# Patient Record
Sex: FEMALE | Race: WHITE | NOT HISPANIC OR LATINO | URBAN - NONMETROPOLITAN AREA
[De-identification: names, ages, dates, MRNs, and addresses within clinical notes are randomized per-mention and may not be internally consistent; named-entity substitution may affect disease eponyms.]

---

## 2020-01-16 ENCOUNTER — IMPORTED ENCOUNTER (OUTPATIENT)
Dept: URBAN - NONMETROPOLITAN AREA CLINIC 1 | Facility: CLINIC | Age: 50
End: 2020-01-16

## 2020-01-16 PROBLEM — H52.4: Noted: 2020-01-16

## 2020-01-16 PROBLEM — H52.13: Noted: 2020-01-16

## 2020-01-16 PROBLEM — H52.223: Noted: 2020-01-16

## 2020-01-16 PROCEDURE — 92310 CONTACT LENS FITTING OU: CPT

## 2020-01-16 PROCEDURE — 92014 COMPRE OPH EXAM EST PT 1/>: CPT

## 2020-01-16 PROCEDURE — 92015 DETERMINE REFRACTIVE STATE: CPT

## 2020-01-16 NOTE — PATIENT DISCUSSION
Myopia OU-Discussed diagnosis with patient. -Explained that people who are myopic are at a higher risk for developing RD/RT and reviewed associated S&S.-Pt to contact our office if symptoms develop. Astigmatism OU-Discussed diagnosis with patient. Presbyopia-Discussed diagnosis with patient. Updated spec Rx given. Recommend lens that will provide comfort as well as protect safety and health of eyes. CL wear-Trial of Acuvue Oasys given today

## 2021-04-01 ENCOUNTER — IMPORTED ENCOUNTER (OUTPATIENT)
Dept: URBAN - NONMETROPOLITAN AREA CLINIC 1 | Facility: CLINIC | Age: 51
End: 2021-04-01

## 2021-04-01 PROCEDURE — 92015 DETERMINE REFRACTIVE STATE: CPT

## 2021-04-01 PROCEDURE — 92014 COMPRE OPH EXAM EST PT 1/>: CPT

## 2021-04-01 NOTE — PATIENT DISCUSSION
Myopia OU-Discussed diagnosis with patient. -Explained that people who are myopic are at a higher risk for developing RD/RT and reviewed associated S&S.-Pt to contact our office if symptoms develop. Astigmatism OU-Discussed diagnosis with patient. Presbyopia-Discussed diagnosis with patient. CL wear-CLs fit and center well.-Stressed that patient should not sleep in CL. -Continue current CL Rx.-CL care and precautions given. Updated spec Rx given. Recommend lens that will provide comfort as well as protect safety and health of eyes.

## 2022-04-09 ASSESSMENT — VISUAL ACUITY
OS_SC: 20/20-2
OD_SC: 20/30-2
OD_SC: 20/25
OS_SC: 20/30-1

## 2022-04-09 ASSESSMENT — TONOMETRY
OD_IOP_MMHG: 16
OD_IOP_MMHG: 14
OS_IOP_MMHG: 14
OS_IOP_MMHG: 16

## 2024-02-13 ENCOUNTER — ESTABLISHED PATIENT (OUTPATIENT)
Dept: RURAL CLINIC 3 | Facility: CLINIC | Age: 54
End: 2024-02-13

## 2024-02-13 DIAGNOSIS — H52.13: ICD-10-CM

## 2024-02-13 DIAGNOSIS — H52.223: ICD-10-CM

## 2024-02-13 DIAGNOSIS — H52.4: ICD-10-CM

## 2024-02-13 PROCEDURE — 92015 DETERMINE REFRACTIVE STATE: CPT

## 2024-02-13 PROCEDURE — 92014 COMPRE OPH EXAM EST PT 1/>: CPT

## 2024-02-13 ASSESSMENT — VISUAL ACUITY
OS_CC: 20/25
OD_CC: 20/25

## 2024-02-13 ASSESSMENT — TONOMETRY
OS_IOP_MMHG: 14
OD_IOP_MMHG: 14

## 2025-05-28 ENCOUNTER — COMPREHENSIVE EXAM (OUTPATIENT)
Age: 55
End: 2025-05-28

## 2025-05-28 DIAGNOSIS — H52.223: ICD-10-CM

## 2025-05-28 DIAGNOSIS — H52.4: ICD-10-CM

## 2025-05-28 DIAGNOSIS — H52.13: ICD-10-CM

## 2025-05-28 PROCEDURE — 92015 DETERMINE REFRACTIVE STATE: CPT

## 2025-05-28 PROCEDURE — 92014 COMPRE OPH EXAM EST PT 1/>: CPT
